# Patient Record
Sex: MALE | Race: WHITE
[De-identification: names, ages, dates, MRNs, and addresses within clinical notes are randomized per-mention and may not be internally consistent; named-entity substitution may affect disease eponyms.]

---

## 2019-11-11 ENCOUNTER — HOSPITAL ENCOUNTER (OUTPATIENT)
Dept: HOSPITAL 96 - M.ERS | Age: 49
Setting detail: OBSERVATION
LOS: 1 days | Discharge: HOME | End: 2019-11-12
Attending: FAMILY MEDICINE | Admitting: FAMILY MEDICINE
Payer: COMMERCIAL

## 2019-11-11 VITALS — DIASTOLIC BLOOD PRESSURE: 99 MMHG | SYSTOLIC BLOOD PRESSURE: 156 MMHG

## 2019-11-11 VITALS — SYSTOLIC BLOOD PRESSURE: 116 MMHG | DIASTOLIC BLOOD PRESSURE: 83 MMHG

## 2019-11-11 VITALS — SYSTOLIC BLOOD PRESSURE: 141 MMHG | DIASTOLIC BLOOD PRESSURE: 94 MMHG

## 2019-11-11 VITALS — HEIGHT: 72.01 IN | WEIGHT: 238.1 LBS | BODY MASS INDEX: 32.25 KG/M2

## 2019-11-11 VITALS — DIASTOLIC BLOOD PRESSURE: 94 MMHG | SYSTOLIC BLOOD PRESSURE: 139 MMHG

## 2019-11-11 DIAGNOSIS — I25.110: Primary | ICD-10-CM

## 2019-11-11 DIAGNOSIS — Z79.82: ICD-10-CM

## 2019-11-11 DIAGNOSIS — I10: ICD-10-CM

## 2019-11-11 DIAGNOSIS — Z82.49: ICD-10-CM

## 2019-11-11 DIAGNOSIS — E66.9: ICD-10-CM

## 2019-11-11 DIAGNOSIS — R59.0: ICD-10-CM

## 2019-11-11 DIAGNOSIS — K21.9: ICD-10-CM

## 2019-11-11 DIAGNOSIS — E78.5: ICD-10-CM

## 2019-11-11 DIAGNOSIS — Z79.899: ICD-10-CM

## 2019-11-11 LAB
ABSOLUTE BASOPHILS: 0.1 THOU/UL (ref 0–0.2)
ABSOLUTE EOSINOPHILS: 0.2 THOU/UL (ref 0–0.7)
ABSOLUTE MONOCYTES: 0.4 THOU/UL (ref 0–1.2)
ALBUMIN SERPL-MCNC: 4.1 G/DL (ref 3.4–5)
ALP SERPL-CCNC: 81 U/L (ref 46–116)
ALT SERPL-CCNC: 48 U/L (ref 30–65)
ANION GAP SERPL CALC-SCNC: 8 MMOL/L (ref 7–16)
AST SERPL-CCNC: 22 U/L (ref 15–37)
BASOPHILS NFR BLD AUTO: 0.9 %
BILIRUB SERPL-MCNC: 0.4 MG/DL
BUN SERPL-MCNC: 16 MG/DL (ref 7–18)
CALCIUM SERPL-MCNC: 9 MG/DL (ref 8.5–10.1)
CHLORIDE SERPL-SCNC: 101 MMOL/L (ref 98–107)
CO2 SERPL-SCNC: 28 MMOL/L (ref 21–32)
CREAT SERPL-MCNC: 1 MG/DL (ref 0.6–1.3)
EOSINOPHIL NFR BLD: 2.9 %
GLUCOSE SERPL-MCNC: 126 MG/DL (ref 70–99)
GRANULOCYTES NFR BLD MANUAL: 63.4 %
HCT VFR BLD CALC: 45.4 % (ref 42–52)
HGB BLD-MCNC: 15.9 GM/DL (ref 14–18)
LYMPHOCYTES # BLD: 1.7 THOU/UL (ref 0.8–5.3)
LYMPHOCYTES NFR BLD AUTO: 26.1 %
MCH RBC QN AUTO: 31.4 PG (ref 26–34)
MCHC RBC AUTO-ENTMCNC: 35 G/DL (ref 28–37)
MCV RBC: 89.6 FL (ref 80–100)
MONOCYTES NFR BLD: 6.7 %
MPV: 8.6 FL. (ref 7.2–11.1)
NEUTROPHILS # BLD: 4 THOU/UL (ref 1.6–8.1)
NT-PRO BRAIN NAT PEPTIDE: 22 PG/ML (ref ?–300)
NUCLEATED RBCS: 0 /100WBC
PLATELET COUNT*: 232 THOU/UL (ref 150–400)
POTASSIUM SERPL-SCNC: 3.8 MMOL/L (ref 3.5–5.1)
PROT SERPL-MCNC: 8.1 G/DL (ref 6.4–8.2)
RBC # BLD AUTO: 5.07 MIL/UL (ref 4.5–6)
RDW-CV: 12.8 % (ref 10.5–14.5)
SODIUM SERPL-SCNC: 137 MMOL/L (ref 136–145)
WBC # BLD AUTO: 6.3 THOU/UL (ref 4–11)

## 2019-11-11 NOTE — NUR
RECEIVED PT FROM ER AT 1630. GET SITUATED TO ROOM. TELE IN PLACED TRACING SR
ON MONITOR. AOX4, UP SBA, O2 SAT 90'S RA. PT DENIES CHEST PAIN.  URINE
COLLECTED. PT FOR CARDIOLOGY CONSULT ADMISSION ASSESSMENT AS CHARTED. VSS,
HOURLY ROUNDING, CALL LIGHT WITHIN REACH, WILL CONTINUE TO MONITOR.

## 2019-11-11 NOTE — EKG
[place] [address]
Phone:  [phone]                     ELECTROCARDIOGRAM REPORT      
_______________________________________________________________________________
 
Name:       TAJ RANDALLYDZACHARY SALDAÑA                   Room:           [address] 
M.R.#:  [id number]      Account #:      [id number]  
Admission:  19     Attend Phys:    Ilya Mcclellan
Discharge:               Date of Birth:  [birth year]  
         Report #: [id number]
    [id number]
_______________________________________________________________________________
THIS REPORT FOR:  //name//                      
 
                         [facility] ED
                                       
Test Date:    2019               Test Time:    10:54:33
Pat Name:     KASSY RANDALL               Department:   
Patient ID:   [id number]            Room:         [facility]
Gender:       M                        Technician:   [place]
:          1970               Requested By: Macy Pereira
Order Number: [id number] MD:   Jd Valero
                                 Measurements
Intervals                              Axis          
Rate:         84                       P:            38
MT:           167                      QRS:          -60
QRSD:         103                      T:            55
QT:           364                                    
QTc:          431                                    
                           Interpretive Statements
Sinus rhythm
Inferior infarct, old
Lateral leads are also involved
Baseline wander in lead(s) V2
No previous ECG available for comparison
 
Electronically Signed On 2019 15:15:58 CST by Jd Valero
[email or web address]
 
 
 
 
 
 
 
 
 
 
 
 
 
 
 
 
 
  <ELECTRONICALLY SIGNED>
                                           By: Jd Valero MD, [facility]      
  19     1515
D: 19 1054   _____________________________________
T: 19 1054   Jd Valero MD, [facility]        /EPI

## 2019-11-12 VITALS — DIASTOLIC BLOOD PRESSURE: 78 MMHG | SYSTOLIC BLOOD PRESSURE: 117 MMHG

## 2019-11-12 VITALS — SYSTOLIC BLOOD PRESSURE: 117 MMHG | DIASTOLIC BLOOD PRESSURE: 78 MMHG

## 2019-11-12 VITALS — DIASTOLIC BLOOD PRESSURE: 89 MMHG | SYSTOLIC BLOOD PRESSURE: 133 MMHG

## 2019-11-12 VITALS — SYSTOLIC BLOOD PRESSURE: 129 MMHG | DIASTOLIC BLOOD PRESSURE: 80 MMHG

## 2019-11-12 VITALS — SYSTOLIC BLOOD PRESSURE: 121 MMHG | DIASTOLIC BLOOD PRESSURE: 82 MMHG

## 2019-11-12 VITALS — DIASTOLIC BLOOD PRESSURE: 85 MMHG | SYSTOLIC BLOOD PRESSURE: 130 MMHG

## 2019-11-12 LAB
ANION GAP SERPL CALC-SCNC: 5 MMOL/L (ref 7–16)
BUN SERPL-MCNC: 15 MG/DL (ref 7–18)
CALCIUM SERPL-MCNC: 8.8 MG/DL (ref 8.5–10.1)
CHLORIDE SERPL-SCNC: 104 MMOL/L (ref 98–107)
CHOLEST SERPL-MCNC: 188 MG/DL (ref ?–200)
CO2 SERPL-SCNC: 30 MMOL/L (ref 21–32)
CREAT SERPL-MCNC: 1.1 MG/DL (ref 0.6–1.3)
EST. AVERAGE GLUCOSE BLD GHB EST-MCNC: 103 MG/DL
GLUCOSE SERPL-MCNC: 98 MG/DL (ref 70–99)
GLYCOHEMOGLOBIN (HGB A1C): 5.2 % (ref 4.8–5.6)
HCT VFR BLD CALC: 45.3 % (ref 42–52)
HDLC SERPL-MCNC: 35 MG/DL (ref 40–?)
HGB BLD-MCNC: 15.8 GM/DL (ref 14–18)
LDLC SERPL-MCNC: 125 MG/DL (ref ?–100)
MCH RBC QN AUTO: 31.1 PG (ref 26–34)
MCHC RBC AUTO-ENTMCNC: 34.9 G/DL (ref 28–37)
MCV RBC: 89 FL (ref 80–100)
MPV: 8 FL. (ref 7.2–11.1)
PLATELET COUNT*: 220 THOU/UL (ref 150–400)
POTASSIUM SERPL-SCNC: 4.6 MMOL/L (ref 3.5–5.1)
RBC # BLD AUTO: 5.09 MIL/UL (ref 4.5–6)
RDW-CV: 12.9 % (ref 10.5–14.5)
SODIUM SERPL-SCNC: 139 MMOL/L (ref 136–145)
TC:HDL: 5.4 RATIO
TRIGL SERPL-MCNC: 142 MG/DL (ref ?–150)
VLDLC SERPL CALC-MCNC: 28 MG/DL (ref ?–40)
WBC # BLD AUTO: 5.8 THOU/UL (ref 4–11)

## 2019-11-12 NOTE — NUR
ASSUMED CARE OF PT AT 1900. PT IS ALERT AND ORIENTED. VSS. PERRLA. NO
COMPLAINTS OF PAIN. STEADY GAIT. PT IS IN SINUS RYTHM ON THE TELEMETRY. PT IS
RESTING COMFORTABLY IN BED. RESPIRATIONS ARE EVEN AND NONLABORED. WILL
CONTINUE TO MONITOR PT.

## 2019-11-12 NOTE — NUR
ASSUMED PT CARE AT 0800, AOX4, UP AD PAMELA, O2 SAT 90'S RA, TRACING SR ON TELE.
PT DENIES CHEST PAIN. PT NPO. PT FOR CARDIAC STRESS TEST. VSS, AM ASSESSMENT
AS CHARTED, HOURLY ROUNDING, WILL CONTINUE TO MONITOR.

## 2019-11-12 NOTE — NUR
STRESS TEST CAME BACK NEGATIVE. CT NECK SUGGEST FOLLOW ULTRASOUND 5-6 MOS.
RELAYED TO PT. DISCHARGE DISCUSS WITH THE PT, REMINDED TO FOLLOW UP WITH PCP
IN 2 WEEKS. IV, TELE REMOVED. LEFT THE UNIT AMBULATORY AT 1830

## 2019-11-12 NOTE — CARDNUC
[place] [address]
Phone:  [phone]                     CARDIAC NUCLEAR IMAGING REPORT
_______________________________________________________________________________
 
Name:         KASSY RANDALL                  [address]          [address]    [id number]     Account #:     [id number]  
Admission:    11/11/19    Attend Phys:   Ilya hennessy Sa
Discharge:                Date of Birth: [birth year]  
Date of Service: 11/12/19 1819  Report #:      [id number]
        [id number] 
_______________________________________________________________________________
THIS REPORT FOR:  //name//                      
 
 
--------------- APPROVED REPORT --------------
 
 
Study performed:  11/12/2019 13:33:44
 
Exam: Nuclear Stress Test
Indication: Chest pain, left arm pain, dyspnea, diaphoresis.
Patient Location: In-Patient
Room #: 219
Stress Tech: Pauline Reed
Stress Nurse: Ileana Reeves RN
 
Ht: 6 ft 0 in  Wt: 238 lbs  BSA:  2.29 m2
    BMI:  32.27
 
Medical History
Medical History: Angina radiating to left arm, Diaphoresis, Dyspnea, 
HTN, Obesity.
Medications: Atorvastatin, Lisinopril, NTG.
Allergies: Bismuth Subsalicylate.
Cardiac Risk Factors: FHX of CAD, HTN, SOB.
Previous Cardiac Procedures: None
Pretest Chest Pain Characteristics: No chest pain
Exercise History: Physically active
Physical Disabilities: None
Meds Held (24 hrs): NTG
 
Stress Test Details
Stress Test:  Exercise stress testing was performed using a Barrington 
protocol.      
HR           
Resting HR:            76 bpm   Max Heart Rate (APMHR): 171 bpm  
Max HR Achieved:  171 bpm   Target HR (85% APMHR): 145 bpm  
% of APMHR:         100         
Recovery HR:            107 bpm        
 
BP           
Resting BP:  125/83 mmHg        
Max BP:       199/91 mmHg        
 
ECG           
Resting ECG:  Sinus Rhythm        
Stress ECG:     Sinus Tachycardia       
 
 
[place] [address]
Phone:  [phone]                     CARDIAC NUCLEAR IMAGING REPORT
_______________________________________________________________________________
 
Name:         KASSY RANDALL                  Room:          [address]#:    [id number]     Account #:     [id number]  
Admission:    11/11/19    Attend Phys:   Ilya hennessy Sa
Discharge:                Date of Birth: [birth year]  
Date of Service: 11/12/19 1819  Report #:      [id number]
        [id number] 
_______________________________________________________________________________
ST Change: None          
Arrhythmia:    None, None        
Recovery ECG: Sinus Rhythm        
Recovery ST Change: None        
Recovery Arrhythmia: None        
 
Clinical
Reason for Termination: Completed protocol, Maximal effort, Target HR 
achieved.
Stress Symptoms: Dyspnea
Exercise duration: 8 min 30 sec
Exercise capacity: 10.16 METs
The patient exhibited good exercise tolerance. There were no 
significant EKG changes with exercise.
 
Nurse Comments
A 49 year old male inpatient presented for Barrington Protocol Nuclear 
Stress Test r/t CP radiating to left arm, Dyspnea with Diaphoresis. 
Patient tolerated treadmill well. Recovery unremarkable. Exercise 
capacity reduced -  normal range. Patient was escorted by staff to 
Nuclear Medicine for images. Patient was stable with no complaints at 
that time.
 
Stress ECG Conclusion
The baseline 12-lead EKG shows sinus rhythm without significant ST or 
T wave abnormality. EKGs obtained during and post exercise showed 
sinus rhythm and sinus tachycardia with no significant ST or T wave 
changes when compared to baseline.
 
NM EXAM: Myocardial Perfusion REST/STRESS
Imaging Protocol: Rest Tc-99m/Stress Tc-99m 1 day
 
Resting Data
Rest SPECT myocardial perfusion imaging was performed in supine 
position 30 minutes following the intravenous injection of 10.7 mCi 
of Tc-99m Sestamibi.
Time of rest injection: 11:35     
The images were gated to evaluate regional wall motion and calculate 
left ventricular ejection fraction. 
Administration Route: IV
Administration Site: Left AC
 
Exercise Stress
At peak stress, the patient was injected intravenously with 35.6mCi 
of Tc-99m Sestamibi.
Time of stress injection: 13:50     
 
 
[place] [address]
Phone:  [phone]                     CARDIAC NUCLEAR IMAGING REPORT
_______________________________________________________________________________
 
Name:         TONIAKASSY A                  [address]          [address]    [id number]     Account #:     [id number]  
Admission:    11/11/19    Attend Phys:   Ilya hennessy Sa
Discharge:                Date of Birth: [birth year]  
Date of Service: 11/12/19 1819  Report #:      [id number]
        [id number] 
_______________________________________________________________________________
Administration Route: IV
Administration Site: Left AC
Heart Rate at time of stress injection: 171 bpm.
Patient continued to exercise for 1 minute(s).
Gated Stress SPECT was performed 30 minutes after stress 
injection.
The images were gated to evaluate regional wall motion and calculate 
left ventricular ejection fraction. 
Prone imaging was performed.
 
Study Quality
Study: Good
Artifact: No artifact 
 
Study Data
At rest, the left ventricular ejection fraction was 66%..   
Post stress, the left ventricular ejection was 67%..   
TID = 0.81.       
 
Perfusion
Normal left ventricular perfusion.
 
Wall Motion
Normal left ventricular wall motion.
 
Nuclear Conclusion
ECG Findings: negative for ischemia
Clinical Findings: negative for ischemia
Nuclear Findings: negative for ischemia
Exercise Capacity: fair
Left Ventricular Function: normal
Risk Study: low
Myocardial perfusion images show no defect to suggest infarct or 
ischemia. Left ventricular systolic function appears normal on gated 
studies. This is a low risk study. 
 
<Conclusion>
The baseline 12-lead EKG shows sinus rhythm without significant ST or 
T wave abnormality. EKGs obtained during and post exercise showed 
sinus rhythm and sinus tachycardia with no significant ST or T wave 
changes when compared to baseline.
 
 
 
  <ELECTRONICALLY SIGNED>
                                           By: Juanito Grande MD, FACC   
  11/12/19 1819
D: 11/12/19 1819   _____________________________________
T: 11/12/19 1819   Juanito Grande MD, FACC     /INF